# Patient Record
Sex: FEMALE
[De-identification: names, ages, dates, MRNs, and addresses within clinical notes are randomized per-mention and may not be internally consistent; named-entity substitution may affect disease eponyms.]

---

## 2017-04-03 ENCOUNTER — APPOINTMENT (OUTPATIENT)
Dept: SURGERY | Facility: CLINIC | Age: 57
End: 2017-04-03

## 2017-04-03 VITALS
OXYGEN SATURATION: 98 % | SYSTOLIC BLOOD PRESSURE: 116 MMHG | BODY MASS INDEX: 29.32 KG/M2 | TEMPERATURE: 98 F | HEIGHT: 65 IN | DIASTOLIC BLOOD PRESSURE: 70 MMHG | HEART RATE: 75 BPM | WEIGHT: 176 LBS | RESPIRATION RATE: 13 BRPM

## 2017-04-03 DIAGNOSIS — E11.9 TYPE 2 DIABETES MELLITUS W/OUT COMPLICATIONS: ICD-10-CM

## 2017-04-03 DIAGNOSIS — R92.8 OTHER ABNORMAL AND INCONCLUSIVE FINDINGS ON DIAGNOSTIC IMAGING OF BREAST: ICD-10-CM

## 2017-04-03 DIAGNOSIS — Z78.9 OTHER SPECIFIED HEALTH STATUS: ICD-10-CM

## 2017-04-03 DIAGNOSIS — Z82.49 FAMILY HISTORY OF ISCHEMIC HEART DISEASE AND OTHER DISEASES OF THE CIRCULATORY SYSTEM: ICD-10-CM

## 2017-04-03 RX ORDER — METFORMIN HYDROCHLORIDE 500 MG/1
500 TABLET, COATED ORAL TWICE DAILY
Refills: 0 | Status: ACTIVE | COMMUNITY

## 2017-04-03 RX ORDER — CANAGLIFLOZIN 300 MG/1
TABLET, FILM COATED ORAL
Refills: 0 | Status: ACTIVE | COMMUNITY

## 2017-04-03 RX ORDER — ASPIRIN ENTERIC COATED TABLETS 81 MG 81 MG/1
81 TABLET, DELAYED RELEASE ORAL
Refills: 0 | Status: ACTIVE | COMMUNITY

## 2017-11-06 ENCOUNTER — APPOINTMENT (OUTPATIENT)
Dept: SURGERY | Facility: CLINIC | Age: 57
End: 2017-11-06

## 2018-01-23 ENCOUNTER — APPOINTMENT (OUTPATIENT)
Dept: SURGERY | Facility: CLINIC | Age: 58
End: 2018-01-23
Payer: MEDICAID

## 2018-01-23 VITALS
TEMPERATURE: 98 F | HEIGHT: 65 IN | BODY MASS INDEX: 29.32 KG/M2 | WEIGHT: 176 LBS | DIASTOLIC BLOOD PRESSURE: 81 MMHG | SYSTOLIC BLOOD PRESSURE: 148 MMHG | HEART RATE: 67 BPM | OXYGEN SATURATION: 98 %

## 2018-01-23 DIAGNOSIS — Z00.00 ENCOUNTER FOR GENERAL ADULT MEDICAL EXAMINATION W/OUT ABNORMAL FINDINGS: ICD-10-CM

## 2018-01-23 PROCEDURE — 99214 OFFICE O/P EST MOD 30 MIN: CPT

## 2018-01-23 RX ORDER — GLIPIZIDE 10 MG/1
10 TABLET ORAL
Refills: 0 | Status: ACTIVE | COMMUNITY

## 2018-06-11 ENCOUNTER — OTHER (OUTPATIENT)
Age: 58
End: 2018-06-11

## 2018-07-24 ENCOUNTER — APPOINTMENT (OUTPATIENT)
Dept: SURGERY | Facility: CLINIC | Age: 58
End: 2018-07-24
Payer: MEDICAID

## 2018-07-24 VITALS
OXYGEN SATURATION: 96 % | HEIGHT: 65 IN | WEIGHT: 169 LBS | BODY MASS INDEX: 28.16 KG/M2 | SYSTOLIC BLOOD PRESSURE: 127 MMHG | HEART RATE: 73 BPM | TEMPERATURE: 98.4 F | DIASTOLIC BLOOD PRESSURE: 77 MMHG

## 2018-07-24 PROCEDURE — 99214 OFFICE O/P EST MOD 30 MIN: CPT

## 2019-03-18 ENCOUNTER — APPOINTMENT (OUTPATIENT)
Dept: SURGERY | Facility: CLINIC | Age: 59
End: 2019-03-18

## 2019-03-18 ENCOUNTER — APPOINTMENT (OUTPATIENT)
Dept: BREAST CENTER | Facility: CLINIC | Age: 59
End: 2019-03-18
Payer: MEDICAID

## 2019-03-18 VITALS
HEIGHT: 65 IN | SYSTOLIC BLOOD PRESSURE: 117 MMHG | HEART RATE: 80 BPM | WEIGHT: 167.5 LBS | DIASTOLIC BLOOD PRESSURE: 77 MMHG | BODY MASS INDEX: 27.91 KG/M2

## 2019-03-18 PROCEDURE — 99214 OFFICE O/P EST MOD 30 MIN: CPT

## 2019-04-04 NOTE — HISTORY OF PRESENT ILLNESS
[FreeTextEntry1] : I had the pleasure of seeing Eder House in the office for breast evaluation.  She is a [place] 57 yo postmenopausal female who has a past medical history of anemia and has been under the care of Dr. Paulino ([facility]).  \par \par She states that she has been in her usual state of health.  She denies dominant breast mass, skin changes or nipple discharge.  \par \par She is , started menses at age 14 with 1st delivery at age 25.  She is perimenopausal.  \par She denies a previous history of malignancy.\par Family history is negative for malignancy.\par \par [facility]\par 2018  DIGITAL SCREENING MAMMOGRAPHY:   Questioned distortion is noted in the slightly medial/retroareolar right breast.  This is best seen on the cc view.  This may be related to prior right breast surgery as the patient indicates that she had right breast surgery 12 years ago and a linear scar marker was placed in this region on the  exam.  Further evaluation with additional views including spot compression views, as targeted sonography as needed is recommended.  This could not be obtained during today's examination due to patients insurance.  BIRADS 0.\par 2018  BWIS ULTRASOUND BREAST BILATERAL:  No mammographic or sonographic evidence for malignancy.  Return annual screening mammography, with next mammogram in 2019, recommended, unless otherwise clinical indicated.  BIRADS 2 Benign.\par \par We reviewed clinical examination which is benign. Recommendation is for annual screening mammogram in 2019 and bilateral breast ultrasound.  Follow up clinical breast exam in 6 months (2019).\par \par All questions were answered.

## 2019-04-04 NOTE — PHYSICAL EXAM
[Normocephalic] : normocephalic [Atraumatic] : atraumatic [PERRL] : pupils equal, round and reactive to light [Sclera nonicteric] : sclera nonicteric [Supple] : supple [No Supraclavicular Adenopathy] : no supraclavicular adenopathy [No Cervical Adenopathy] : no cervical adenopathy [No Thyromegaly] : no thyromegaly [Examined in the supine and seated position] : examined in the supine and seated position [No dominant masses] : no dominant masses in right breast  [No dominant masses] : no dominant masses left breast [No Nipple Retraction] : no left nipple retraction [No Nipple Discharge] : no left nipple discharge [Breast Nipple Inversion] : nipples not inverted [Breast Nipple Retraction] : nipples not retracted [Breast Nipple Flattening] : nipples not flattened [Breast Nipple Fissures] : nipples not fissured [Breast Abnormal Lactation (Galactorrhea)] : no galactorrhea [Breast Abnormal Secretion Bloody Fluid] : no bloody discharge [Breast Abnormal Secretion Serous Fluid] : no serous discharge [Breast Abnormal Secretion Opalescent Fluid] : no milky discharge [No Axillary Lymphadenopathy] : no left axillary lymphadenopathy [No Edema] : no edema [No Rashes] : no rashes [No Ulceration] : no ulceration [de-identified] : A&Ox 3 NAD [de-identified] : No supraclavicular or axillary adenopathy. No dominant masses, normal to palpation. Everted nipple without discharge. No skin changes.\par  [de-identified] : No supraclavicular or axillary adenopathy. No dominant masses, normal to palpation. Everted nipple without discharge. No skin changes.\par \par

## 2019-04-04 NOTE — ASSESSMENT
[FreeTextEntry1] : 55 yo perimenopausal female with fibrocystic disease of the breast and a history of anemia presents for clinical breast evaluation.  Clinical examination is benign.  Recent imaging demonstrated bilateral breast cysts.  Density is extremely dense on mammogram.  Recommendation for screening mammogram and ultrasound in 7/2019.  Follow up visit in 6 months.\par 1. Bilateral screening mammogram and ultrasound 7/2019\par 2. Clinical examination in 6 months 9/2019\par

## 2019-04-04 NOTE — PAST MEDICAL HISTORY
[Postmenopausal] : The patient is postmenopausal [Approximately ___] : the LMP was approximately [unfilled] [Regular Cycle Intervals] : have been regular [Total Preg ___] : G[unfilled] [Live Births ___] : P[unfilled]

## 2019-09-17 ENCOUNTER — APPOINTMENT (OUTPATIENT)
Dept: SURGERY | Facility: CLINIC | Age: 59
End: 2019-09-17

## 2019-11-04 ENCOUNTER — APPOINTMENT (OUTPATIENT)
Dept: SURGERY | Facility: CLINIC | Age: 59
End: 2019-11-04
Payer: MEDICAID

## 2019-11-04 VITALS
WEIGHT: 171 LBS | BODY MASS INDEX: 28.49 KG/M2 | DIASTOLIC BLOOD PRESSURE: 85 MMHG | SYSTOLIC BLOOD PRESSURE: 149 MMHG | HEART RATE: 73 BPM | HEIGHT: 65 IN

## 2019-11-04 PROCEDURE — 99214 OFFICE O/P EST MOD 30 MIN: CPT

## 2019-11-04 NOTE — HISTORY OF PRESENT ILLNESS
[FreeTextEntry1] : I had the pleasure of seeing Eder House in the office for breast evaluation.  She is a [place] 57 yo postmenopausal female who has a past medical history of anemia and has been under the care of Dr. Paulino ([facility]).  \par \par She states that she has been in her usual state of health.  She denies dominant breast mass, skin changes or nipple discharge.  \par \par She is , started menses at age 14 with 1st delivery at age 25.  She is perimenopausal.  \par She denies a previous history of malignancy.\par Family history is negative for malignancy.\par \par Imaging:\par [facility] \par Digital screening mammography 10/23/2019 \par Impression: No mammographic or sonographic evidence of malignancy. Annual mammographic screening is recommended unless otherwise clinically indicated. BI-RADS:2, benign\par \par [facility]\par 2018  DIGITAL SCREENING MAMMOGRAPHY:   Questioned distortion is noted in the slightly medial/retroareolar right breast.  This is best seen on the cc view.  This may be related to prior right breast surgery as the patient indicates that she had right breast surgery 12 years ago and a linear scar marker was placed in this region on the  exam.  Further evaluation with additional views including spot compression views, as targeted sonography as needed is recommended.  This could not be obtained during today's examination due to patients insurance.  BIRADS 0.\par 2018  BWIS ULTRASOUND BREAST BILATERAL:  No mammographic or sonographic evidence for malignancy.  Return annual screening mammography, with next mammogram in 2019, recommended, unless otherwise clinical indicated.  BIRADS 2 Benign.\par \par We reviewed clinical examination which is benign and demonstrates a stable 1.8cm solid nodule in the right breast. Recommendation is for annual screening mammogram in 2020 and bilateral breast ultrasound.  Follow up clinical breast exam in 6 months (2020).\par \par All questions were answered.

## 2019-11-04 NOTE — PHYSICAL EXAM
[Normocephalic] : normocephalic [Atraumatic] : atraumatic [PERRL] : pupils equal, round and reactive to light [Sclera nonicteric] : sclera nonicteric [Supple] : supple [No Supraclavicular Adenopathy] : no supraclavicular adenopathy [No Cervical Adenopathy] : no cervical adenopathy [No Thyromegaly] : no thyromegaly [Examined in the supine and seated position] : examined in the supine and seated position [No dominant masses] : no dominant masses in right breast  [No dominant masses] : no dominant masses left breast [No Nipple Retraction] : no left nipple retraction [No Nipple Discharge] : no left nipple discharge [No Axillary Lymphadenopathy] : no left axillary lymphadenopathy [No Edema] : no edema [No Rashes] : no rashes [No Ulceration] : no ulceration [EOMI] : extra ocular movement intact [Breast Nipple Inversion] : nipples not inverted [Breast Nipple Retraction] : nipples not retracted [Breast Nipple Flattening] : nipples not flattened [Breast Nipple Fissures] : nipples not fissured [Breast Abnormal Lactation (Galactorrhea)] : no galactorrhea [Breast Abnormal Secretion Bloody Fluid] : no bloody discharge [Breast Abnormal Secretion Serous Fluid] : no serous discharge [Breast Abnormal Secretion Opalescent Fluid] : no milky discharge [de-identified] : A&Ox 3 NAD [de-identified] : No supraclavicular or axillary adenopathy. No dominant masses, normal to palpation. Everted nipple without discharge. No skin changes.\par  [de-identified] : No supraclavicular or axillary adenopathy. No dominant masses, normal to palpation. Everted nipple without discharge. No skin changes.\par \par

## 2019-11-04 NOTE — ASSESSMENT
[FreeTextEntry1] : 60 yo perimenopausal female with fibrocystic disease of the breast and a history of anemia presents for clinical breast evaluation.  Clinical examination is benign.  Recent imaging demonstrated bilateral breast cysts.  Density is extremely dense on mammogram.  Recommendation for screening mammogram and ultrasound in 10/2020.  Follow up visit in 6 months.\par 1. Bilateral screening mammogram and ultrasound 10/2020\par 2. Clinical examination in 6 months 05/2020\par

## 2020-05-04 ENCOUNTER — APPOINTMENT (OUTPATIENT)
Dept: SURGERY | Facility: CLINIC | Age: 60
End: 2020-05-04

## 2020-11-19 ENCOUNTER — APPOINTMENT (OUTPATIENT)
Dept: SURGERY | Facility: CLINIC | Age: 60
End: 2020-11-19

## 2020-11-19 ENCOUNTER — APPOINTMENT (OUTPATIENT)
Dept: BREAST CENTER | Facility: CLINIC | Age: 60
End: 2020-11-19

## 2021-04-01 ENCOUNTER — APPOINTMENT (OUTPATIENT)
Dept: SURGERY | Facility: CLINIC | Age: 61
End: 2021-04-01

## 2021-04-01 ENCOUNTER — APPOINTMENT (OUTPATIENT)
Dept: BREAST CENTER | Facility: CLINIC | Age: 61
End: 2021-04-01
Payer: MEDICAID

## 2021-04-01 VITALS
SYSTOLIC BLOOD PRESSURE: 120 MMHG | HEIGHT: 65 IN | BODY MASS INDEX: 26.99 KG/M2 | HEART RATE: 72 BPM | TEMPERATURE: 98.2 F | WEIGHT: 162 LBS | OXYGEN SATURATION: 98 % | DIASTOLIC BLOOD PRESSURE: 78 MMHG

## 2021-04-01 PROCEDURE — 99213 OFFICE O/P EST LOW 20 MIN: CPT

## 2021-04-01 NOTE — ASSESSMENT
[FreeTextEntry1] : 61 yo perimenopausal female with fibrocystic disease of the breast and a history of anemia presents for clinical breast evaluation.  Clinical examination is benign.  Recent imaging is benign.  Recommendation for screening mammogram and ultrasound in 3/2022 and follow up visit in 6 months.\par 1. Bilateral screening mammogram and ultrasound 3/20/2022\par 2. Clinical examination in 6 months 10/2021\par

## 2021-04-01 NOTE — PHYSICAL EXAM
[Normocephalic] : normocephalic [Atraumatic] : atraumatic [EOMI] : extra ocular movement intact [PERRL] : pupils equal, round and reactive to light [Sclera nonicteric] : sclera nonicteric [Supple] : supple [No Supraclavicular Adenopathy] : no supraclavicular adenopathy [No Cervical Adenopathy] : no cervical adenopathy [No Thyromegaly] : no thyromegaly [Examined in the supine and seated position] : examined in the supine and seated position [No dominant masses] : no dominant masses in right breast  [No dominant masses] : no dominant masses left breast [No Nipple Retraction] : no left nipple retraction [No Nipple Discharge] : no left nipple discharge [Breast Nipple Inversion] : nipples not inverted [Breast Nipple Retraction] : nipples not retracted [Breast Nipple Flattening] : nipples not flattened [Breast Nipple Fissures] : nipples not fissured [Breast Abnormal Lactation (Galactorrhea)] : no galactorrhea [Breast Abnormal Secretion Bloody Fluid] : no bloody discharge [Breast Abnormal Secretion Serous Fluid] : no serous discharge [Breast Abnormal Secretion Opalescent Fluid] : no milky discharge [No Axillary Lymphadenopathy] : no left axillary lymphadenopathy [No Edema] : no edema [No Rashes] : no rashes [No Ulceration] : no ulceration [de-identified] : A&Ox 3 NAD [de-identified] : No supraclavicular or axillary adenopathy. No dominant masses, normal to palpation. Everted nipple without discharge. No skin changes.\par  [de-identified] : No supraclavicular or axillary adenopathy. No dominant masses, normal to palpation. Everted nipple without discharge. No skin changes.\par \par

## 2021-04-01 NOTE — HISTORY OF PRESENT ILLNESS
[FreeTextEntry1] : I had the pleasure of seeing Eder House in the office for breast evaluation.  She is a [place] 61 yo postmenopausal female who has a past medical history of anemia and has been under the care of Dr. Paulino ([facility]).  \par \par She states that she has been in her usual state of health.  She denies dominant breast mass, skin changes or nipple discharge.  \par \par She is , started menses at age 14 with 1st delivery at age 25.  She is perimenopausal.  \par She denies a previous history of malignancy.\par Family history is negative for malignancy.\par \par Imaging:\par [facility] \par Digital screening mammography 2021\par Impression: No mammographic or sonographic evidence of malignancy. Annual mammographic screening is recommended unless otherwise clinically indicated. BI-RADS:2, benign\par \par We reviewed clinical examination which is benign and recent imaging is stable. Recommendation is for follow up clinical breast exam in 6 months and continue annual screening imaing.\par \par All questions were answered.

## 2021-10-05 ENCOUNTER — APPOINTMENT (OUTPATIENT)
Dept: SURGERY | Facility: CLINIC | Age: 61
End: 2021-10-05
Payer: MEDICAID

## 2021-10-05 VITALS
HEART RATE: 69 BPM | HEIGHT: 65 IN | TEMPERATURE: 97.4 F | SYSTOLIC BLOOD PRESSURE: 119 MMHG | WEIGHT: 158 LBS | BODY MASS INDEX: 26.33 KG/M2 | OXYGEN SATURATION: 98 % | DIASTOLIC BLOOD PRESSURE: 73 MMHG

## 2021-10-05 DIAGNOSIS — Z12.39 ENCOUNTER FOR OTHER SCREENING FOR MALIGNANT NEOPLASM OF BREAST: ICD-10-CM

## 2021-10-05 PROCEDURE — 99214 OFFICE O/P EST MOD 30 MIN: CPT

## 2021-10-06 PROBLEM — Z12.39 SCREENING FOR BREAST CANCER: Status: ACTIVE | Noted: 2017-04-03

## 2021-10-06 NOTE — ASSESSMENT
[FreeTextEntry1] : 62 yo perimenopausal female with fibrocystic disease of the breast and a history of anemia presents for clinical breast evaluation.  Clinical examination is benign.  Recent imaging is benign.  Recommendation for screening mammogram and ultrasound in 3/2022 and follow up visit in 6 months.\par 1. Bilateral screening mammogram and ultrasound 3/20/2022\par 2. Clinical examination in 6 months 4/2022\par

## 2021-10-06 NOTE — PHYSICAL EXAM
[Normocephalic] : normocephalic [Atraumatic] : atraumatic [EOMI] : extra ocular movement intact [PERRL] : pupils equal, round and reactive to light [Sclera nonicteric] : sclera nonicteric [Supple] : supple [No Supraclavicular Adenopathy] : no supraclavicular adenopathy [No Cervical Adenopathy] : no cervical adenopathy [No Thyromegaly] : no thyromegaly [Examined in the supine and seated position] : examined in the supine and seated position [No dominant masses] : no dominant masses in right breast  [No dominant masses] : no dominant masses left breast [No Nipple Retraction] : no left nipple retraction [No Nipple Discharge] : no left nipple discharge [Breast Nipple Inversion] : nipples not inverted [Breast Nipple Retraction] : nipples not retracted [Breast Nipple Flattening] : nipples not flattened [Breast Nipple Fissures] : nipples not fissured [Breast Abnormal Lactation (Galactorrhea)] : no galactorrhea [Breast Abnormal Secretion Bloody Fluid] : no bloody discharge [Breast Abnormal Secretion Opalescent Fluid] : no milky discharge [Breast Abnormal Secretion Serous Fluid] : no serous discharge [No Axillary Lymphadenopathy] : no left axillary lymphadenopathy [No Edema] : no edema [No Rashes] : no rashes [No Ulceration] : no ulceration [de-identified] : A&Ox 3 NAD [de-identified] : No supraclavicular or axillary adenopathy. No dominant masses, normal to palpation. Everted nipple without discharge. No skin changes.\par  [de-identified] : No supraclavicular or axillary adenopathy. No dominant masses, normal to palpation. Everted nipple without discharge. No skin changes.\par \par

## 2021-10-06 NOTE — HISTORY OF PRESENT ILLNESS
[FreeTextEntry1] : I had the pleasure of seeing Eder House in the office for breast evaluation.  \par \par She is a [place] 62 yo postmenopausal female who has a past medical history of anemia and has been under the care of Dr. Paulino ([facility]).  \par \par She states that she has been in her usual state of health.  She denies dominant breast mass, skin changes or nipple discharge.  \par \par She is , started menses at age 14 with 1st delivery at age 25.  She is perimenopausal.  \par She denies a previous history of malignancy.\par Family history is negative for malignancy.\par \par Imaging:\par [facility] \par Digital screening mammography 3/19/2021\par Impression: No mammographic or sonographic evidence of malignancy. Annual mammographic screening is recommended unless otherwise clinically indicated. BI-RADS:2, benign\par \par We reviewed clinical examination which is benign and recent imaging is stable. Recommendation is for follow up clinical breast exam in 6 months and continue annual screening imaging.\par \par All questions were answered.

## 2022-04-05 ENCOUNTER — APPOINTMENT (OUTPATIENT)
Dept: BREAST CENTER | Facility: CLINIC | Age: 62
End: 2022-04-05
Payer: MEDICAID

## 2022-04-05 ENCOUNTER — APPOINTMENT (OUTPATIENT)
Dept: SURGERY | Facility: CLINIC | Age: 62
End: 2022-04-05

## 2022-04-05 VITALS
TEMPERATURE: 97.8 F | OXYGEN SATURATION: 98 % | HEART RATE: 77 BPM | SYSTOLIC BLOOD PRESSURE: 137 MMHG | WEIGHT: 157 LBS | DIASTOLIC BLOOD PRESSURE: 83 MMHG | BODY MASS INDEX: 26.16 KG/M2 | HEIGHT: 65 IN

## 2022-04-05 DIAGNOSIS — N60.19 DIFFUSE CYSTIC MASTOPATHY OF UNSPECIFIED BREAST: ICD-10-CM

## 2022-04-05 PROCEDURE — 99214 OFFICE O/P EST MOD 30 MIN: CPT

## 2022-04-05 NOTE — ASSESSMENT
[FreeTextEntry1] : 62 yo perimenopausal female with fibrocystic disease of the breast and a history of anemia presents for clinical breast evaluation.  Clinical examination is benign.   Recommendation for screening mammogram and ultrasound due now and follow up visit in 6 months if imaging stable.\par 1. Bilateral screening mammogram and ultrasound 3/20/2022\par 2. Clinical examination in 6 months 10/2022\par

## 2022-04-05 NOTE — HISTORY OF PRESENT ILLNESS
[FreeTextEntry1] : I had the pleasure of seeing Eder House in the office for breast evaluation.  \par \par She is a [place] 60 yo postmenopausal female who has a past medical history of anemia and has been under the care of Dr. Paulino ([facility]).  \par \par She states that she has been in her usual state of health.  She denies dominant breast mass, skin changes or nipple discharge.  \par \par She is , started menses at age 14 with 1st delivery at age 25.  She is perimenopausal.  \par She denies a previous history of malignancy.\par Family history is negative for malignancy.\par \par Imaging:\par [facility] \par Digital screening mammography 3/19/2021\par Impression: No mammographic or sonographic evidence of malignancy. Annual mammographic screening is recommended unless otherwise clinically indicated. BI-RADS:2, benign\par \par Reviewed clinical breast exam and imaging. CBE benign. She is due for screening mammogram/sonogram, script provided. If imaging stable, then follow up in 6 months. All questions answered.\par \par She understands and agrees with plan.  All questions answered.\par \par

## 2022-04-05 NOTE — PHYSICAL EXAM
[Normocephalic] : normocephalic [Atraumatic] : atraumatic [EOMI] : extra ocular movement intact [PERRL] : pupils equal, round and reactive to light [Sclera nonicteric] : sclera nonicteric [Supple] : supple [No Supraclavicular Adenopathy] : no supraclavicular adenopathy [No Cervical Adenopathy] : no cervical adenopathy [No Thyromegaly] : no thyromegaly [Examined in the supine and seated position] : examined in the supine and seated position [No dominant masses] : no dominant masses in right breast  [No dominant masses] : no dominant masses left breast [No Nipple Retraction] : no left nipple retraction [No Nipple Discharge] : no left nipple discharge [No Axillary Lymphadenopathy] : no left axillary lymphadenopathy [No Edema] : no edema [No Rashes] : no rashes [No Ulceration] : no ulceration [Breast Nipple Inversion] : nipples not inverted [Breast Nipple Retraction] : nipples not retracted [Breast Nipple Flattening] : nipples not flattened [Breast Nipple Fissures] : nipples not fissured [Breast Abnormal Lactation (Galactorrhea)] : no galactorrhea [Breast Abnormal Secretion Bloody Fluid] : no bloody discharge [Breast Abnormal Secretion Serous Fluid] : no serous discharge [Breast Abnormal Secretion Opalescent Fluid] : no milky discharge [de-identified] : A&Ox 3 NAD [de-identified] : No supraclavicular or axillary adenopathy. No dominant masses, normal to palpation. Everted nipple without discharge. No skin changes.\par  [de-identified] : No supraclavicular or axillary adenopathy. No dominant masses, normal to palpation. Everted nipple without discharge. No skin changes.\par \par

## 2022-11-18 ENCOUNTER — APPOINTMENT (OUTPATIENT)
Dept: SURGERY | Facility: CLINIC | Age: 62
End: 2022-11-18

## 2023-10-31 ENCOUNTER — NON-APPOINTMENT (OUTPATIENT)
Age: 63
End: 2023-10-31

## 2023-10-31 ENCOUNTER — APPOINTMENT (OUTPATIENT)
Dept: OPHTHALMOLOGY | Facility: CLINIC | Age: 63
End: 2023-10-31
Payer: MEDICAID

## 2023-10-31 PROCEDURE — 92014 COMPRE OPH EXAM EST PT 1/>: CPT

## 2024-12-09 ENCOUNTER — NON-APPOINTMENT (OUTPATIENT)
Age: 64
End: 2024-12-09

## 2024-12-09 ENCOUNTER — APPOINTMENT (OUTPATIENT)
Dept: OPHTHALMOLOGY | Facility: CLINIC | Age: 64
End: 2024-12-09
Payer: MEDICAID

## 2024-12-09 PROCEDURE — 92014 COMPRE OPH EXAM EST PT 1/>: CPT

## 2025-03-24 ENCOUNTER — APPOINTMENT (OUTPATIENT)
Dept: OPHTHALMOLOGY | Facility: CLINIC | Age: 65
End: 2025-03-24